# Patient Record
Sex: FEMALE
[De-identification: names, ages, dates, MRNs, and addresses within clinical notes are randomized per-mention and may not be internally consistent; named-entity substitution may affect disease eponyms.]

---

## 2017-04-11 ENCOUNTER — MEDICATION RENEWAL (OUTPATIENT)
Age: 22
End: 2017-04-11

## 2017-07-06 ENCOUNTER — MEDICATION RENEWAL (OUTPATIENT)
Age: 22
End: 2017-07-06

## 2017-08-23 ENCOUNTER — LABORATORY RESULT (OUTPATIENT)
Age: 22
End: 2017-08-23

## 2017-08-23 ENCOUNTER — APPOINTMENT (OUTPATIENT)
Dept: OBGYN | Facility: CLINIC | Age: 22
End: 2017-08-23
Payer: COMMERCIAL

## 2017-08-23 VITALS
BODY MASS INDEX: 23.7 KG/M2 | WEIGHT: 160 LBS | HEIGHT: 69 IN | DIASTOLIC BLOOD PRESSURE: 68 MMHG | SYSTOLIC BLOOD PRESSURE: 120 MMHG

## 2017-08-23 PROCEDURE — 99395 PREV VISIT EST AGE 18-39: CPT

## 2017-08-24 LAB
C TRACH RRNA SPEC QL NAA+PROBE: NORMAL
N GONORRHOEA RRNA SPEC QL NAA+PROBE: NORMAL
SOURCE TP AMPLIFICATION: NORMAL

## 2017-08-30 LAB — CYTOLOGY CVX/VAG DOC THIN PREP: NORMAL

## 2017-10-09 ENCOUNTER — MEDICATION RENEWAL (OUTPATIENT)
Age: 22
End: 2017-10-09

## 2018-05-14 ENCOUNTER — RX RENEWAL (OUTPATIENT)
Age: 23
End: 2018-05-14

## 2018-11-12 ENCOUNTER — RX RENEWAL (OUTPATIENT)
Age: 23
End: 2018-11-12

## 2019-02-05 ENCOUNTER — RX RENEWAL (OUTPATIENT)
Age: 24
End: 2019-02-05

## 2019-02-12 ENCOUNTER — RX RENEWAL (OUTPATIENT)
Age: 24
End: 2019-02-12

## 2019-03-11 ENCOUNTER — RX RENEWAL (OUTPATIENT)
Age: 24
End: 2019-03-11

## 2019-03-13 ENCOUNTER — RX RENEWAL (OUTPATIENT)
Age: 24
End: 2019-03-13

## 2019-03-14 ENCOUNTER — APPOINTMENT (OUTPATIENT)
Dept: OBGYN | Facility: CLINIC | Age: 24
End: 2019-03-14
Payer: COMMERCIAL

## 2019-03-14 VITALS
WEIGHT: 160 LBS | SYSTOLIC BLOOD PRESSURE: 106 MMHG | DIASTOLIC BLOOD PRESSURE: 64 MMHG | HEIGHT: 69 IN | BODY MASS INDEX: 23.7 KG/M2

## 2019-03-14 PROCEDURE — 99395 PREV VISIT EST AGE 18-39: CPT

## 2019-03-14 NOTE — CHIEF COMPLAINT
[Annual Visit] : annual visit [FreeTextEntry1] : Patient is present for her annual visit. LMP: 3 weeks ago. Pt states she has no GYN complaints at this time. Declined MA

## 2019-03-14 NOTE — HISTORY OF PRESENT ILLNESS
[___ Year(s) Ago] : [unfilled] year(s) ago [Good] : being in good health [Healthy Diet] : a healthy diet [Reproductive Age] : is of reproductive age

## 2019-03-15 LAB — HPV HIGH+LOW RISK DNA PNL CVX: NOT DETECTED

## 2019-03-20 LAB — CYTOLOGY CVX/VAG DOC THIN PREP: NORMAL

## 2019-04-11 ENCOUNTER — APPOINTMENT (OUTPATIENT)
Dept: OBGYN | Facility: CLINIC | Age: 24
End: 2019-04-11

## 2019-04-17 ENCOUNTER — APPOINTMENT (OUTPATIENT)
Dept: OBGYN | Facility: CLINIC | Age: 24
End: 2019-04-17

## 2019-09-03 ENCOUNTER — RX RENEWAL (OUTPATIENT)
Age: 24
End: 2019-09-03

## 2019-12-05 ENCOUNTER — RX RENEWAL (OUTPATIENT)
Age: 24
End: 2019-12-05

## 2020-03-03 ENCOUNTER — RX RENEWAL (OUTPATIENT)
Age: 25
End: 2020-03-03

## 2020-06-25 ENCOUNTER — APPOINTMENT (OUTPATIENT)
Dept: OBGYN | Facility: CLINIC | Age: 25
End: 2020-06-25

## 2020-07-12 ENCOUNTER — TRANSCRIPTION ENCOUNTER (OUTPATIENT)
Age: 25
End: 2020-07-12

## 2020-07-15 ENCOUNTER — TRANSCRIPTION ENCOUNTER (OUTPATIENT)
Age: 25
End: 2020-07-15

## 2020-09-01 ENCOUNTER — APPOINTMENT (OUTPATIENT)
Dept: OBGYN | Facility: CLINIC | Age: 25
End: 2020-09-01
Payer: COMMERCIAL

## 2020-09-01 VITALS
TEMPERATURE: 98.1 F | BODY MASS INDEX: 25.18 KG/M2 | DIASTOLIC BLOOD PRESSURE: 68 MMHG | SYSTOLIC BLOOD PRESSURE: 108 MMHG | HEIGHT: 69 IN | WEIGHT: 170 LBS

## 2020-09-01 PROCEDURE — 99395 PREV VISIT EST AGE 18-39: CPT

## 2020-09-01 NOTE — CHIEF COMPLAINT
[Annual Visit] : annual visit [FreeTextEntry1] : ERICKA ALCANTARA a 24 year old female presents in office today for a routine annual exam. LMP was 06/01/2020. Last exam was done on 3/14/2019.  Patient denies MOA in the room.

## 2020-09-01 NOTE — PHYSICAL EXAM
[Awake] : awake [Alert] : alert [Soft] : soft [Oriented x3] : oriented to person, place, and time [No Lesions] : no genitalia lesions [Normal] : uterus [Labia Majora] : labia major [No Bleeding] : there was no active vaginal bleeding [Pap Obtained] : a Pap smear was performed [Anteversion] : anteverted [Uterine Adnexae] : were not tender and not enlarged [RRR, No Murmurs] : RRR, no murmurs [CTAB] : CTAB [Acute Distress] : no acute distress [LAD] : no lymphadenopathy [Thyroid Nodule] : no thyroid nodule [Goiter] : no goiter [Mass] : no breast mass [Nipple Discharge] : no nipple discharge [Axillary LAD] : no axillary lymphadenopathy [Tender] : non tender [Distended] : not distended [H/Smegaly] : no hepatosplenomegaly [Depressed Mood] : not depressed [Flat Affect] : affect not flat [Discharge] : had no discharge [Motion Tenderness] : there was no cervical motion tenderness [Tenderness] : nontender [Enlarged ___ wks] : not enlarged [Adnexa Tenderness] : were not tender [Ovarian Mass (___ Cm)] : there were no adnexal masses [FreeTextEntry5] : GC chlamydia culture

## 2020-09-01 NOTE — HISTORY OF PRESENT ILLNESS
[1 Year Ago] : 1 year ago [Good] : being in good health [Healthy Diet] : a healthy diet [Reproductive Age] : is of reproductive age

## 2020-09-17 LAB — CYTOLOGY CVX/VAG DOC THIN PREP: ABNORMAL

## 2020-11-03 ENCOUNTER — APPOINTMENT (OUTPATIENT)
Dept: OBGYN | Facility: CLINIC | Age: 25
End: 2020-11-03
Payer: COMMERCIAL

## 2020-11-03 VITALS
WEIGHT: 160 LBS | HEIGHT: 69 IN | SYSTOLIC BLOOD PRESSURE: 120 MMHG | BODY MASS INDEX: 23.7 KG/M2 | TEMPERATURE: 98.1 F | DIASTOLIC BLOOD PRESSURE: 72 MMHG

## 2020-11-03 PROCEDURE — 99072 ADDL SUPL MATRL&STAF TM PHE: CPT

## 2020-11-03 PROCEDURE — 57454 BX/CURETT OF CERVIX W/SCOPE: CPT

## 2020-11-03 NOTE — PROCEDURE
[Colposcopy] : Colposcopy  [Time out performed] : Pre-procedure time out performed.  Patient's name, date of birth and procedure confirmed. [Consent Obtained] : Consent obtained [Risks] : risks [Benefits] : benefits [Alternatives] : alternatives [Patient] : patient [Infection] : infection [Bleeding] : bleeding [LGSIL] : LGSIL [No Premedication] : no premedication [Colposcopy Adequate] : colposcopy adequate [SCI Fully Visualized] : SCI fully visualized [ECC Performed] : ECC performed [Lesion] : lesion seen [Biopsy] : biopsy taken [Hemostasis Obtained] : Hemostasis obtained [Tolerated Well] : the patient tolerated the procedure well [de-identified] : 1 [de-identified] : after applying acetic acid to the cervix and visualized through colposcope slight acetowhite area at approximately 11:00 [de-identified] : 11 [de-identified] : fragment of tissue [de-identified] : using silver nitrate [de-identified] : hPV changes

## 2020-11-05 LAB
HCG UR QL: NEGATIVE
QUALITY CONTROL: YES

## 2020-11-13 LAB — CORE LAB BIOPSY: NORMAL

## 2021-03-08 ENCOUNTER — RX RENEWAL (OUTPATIENT)
Age: 26
End: 2021-03-08

## 2021-06-01 ENCOUNTER — RX RENEWAL (OUTPATIENT)
Age: 26
End: 2021-06-01

## 2021-06-14 ENCOUNTER — RX RENEWAL (OUTPATIENT)
Age: 26
End: 2021-06-14

## 2021-09-13 ENCOUNTER — RX RENEWAL (OUTPATIENT)
Age: 26
End: 2021-09-13

## 2021-09-14 ENCOUNTER — RX RENEWAL (OUTPATIENT)
Age: 26
End: 2021-09-14

## 2023-03-16 ENCOUNTER — APPOINTMENT (OUTPATIENT)
Dept: ORTHOPEDIC SURGERY | Facility: CLINIC | Age: 28
End: 2023-03-16
Payer: COMMERCIAL

## 2023-03-16 VITALS
BODY MASS INDEX: 23.7 KG/M2 | WEIGHT: 160 LBS | DIASTOLIC BLOOD PRESSURE: 81 MMHG | TEMPERATURE: 97.9 F | HEIGHT: 69 IN | SYSTOLIC BLOOD PRESSURE: 111 MMHG | HEART RATE: 101 BPM

## 2023-03-16 DIAGNOSIS — M25.561 PAIN IN RIGHT KNEE: ICD-10-CM

## 2023-03-16 PROCEDURE — 73562 X-RAY EXAM OF KNEE 3: CPT | Mod: RT

## 2023-03-16 PROCEDURE — 99203 OFFICE O/P NEW LOW 30 MIN: CPT

## 2023-03-16 NOTE — HISTORY OF PRESENT ILLNESS
[de-identified] : 3/16/2023: Dior is a pleasant 27-year-old female who presents to the office today for evaluation of a right knee injury.  The patient states that she was out with her friends and one of them tackled her when they are play fighting.  She felt a twisting sensation and a pop in her knee.  She subsequent developed some swelling in the knee over the next couple of days.  Since that time she has had persistent pain on the inside part of the knee.  She is generally pretty active and likes to workout in the gym.  She cannot squat, bend, or twist on the knee without pain.  She has never had the symptoms before this.  She does occasionally have sensations of instability which she has never had before.  The patient denies any fevers, chills, sweats, recent illnesses, numbness, tingling, weakness, or pain elsewhere at this time.

## 2023-03-16 NOTE — DISCUSSION/SUMMARY
[de-identified] : Assessment: 27-year-old female with right knee pain and swelling\par \par Plan: I had a long discussion with the patient today regarding the nature of their diagnosis and treatment plan. We discussed the risks and benefits of no treatment as well as nonoperative and operative treatments.  I reviewed the patient's x-rays today with her in the office which are negative for any fracture.  On examination she has pain about the medial and lateral joint lines and a mild effusion.  Given her mechanism of injury and age I would recommend an MRI to rule out any internal derangement such as meniscal injury, ligamentous injury, or loose body.  She may continue to treat herself symptomatically on her own at home.  Recommend follow-up after the MRI to discuss results in person then any further recommendations.  Should she have any significant internal derangement surgical intervention could be indicated.\par \par The patient verbalizes their understanding and agrees with the plan.  All questions were answered to their satisfaction.

## 2023-03-16 NOTE — PHYSICAL EXAM
[de-identified] : General:\par Awake, alert, no acute distress, Patient was cooperative and appropriate during the examination.\par \par The patient is of normal weight for height and age.\par \par Walks with an antalgic gait on the right side.\par \par Full, painless range of motion of the neck and back.\par \par Exam of the bilateral lower extremities is intact and symmetric with regards to dermatologic, vascular, and neurologic exam. Bilateral lower extremity sensation is grossly intact to light touch in the DP/SP/T/S/S nerve distributions. Intact DF/PF/EHL. BIlateral lower extremity warm and well-perfused with brisk capillary refill.\par \par Pulmonary:\par Regular, nonlabored breathing\par \par Abdomen:\par Soft, nontender, nondistended.\par \par Lymphatic:\par No evidence of inguinal lymphadenopathy\par \par Right knee Examination:\par Physical examination of the knee demonstrates normal skin without signs of skin changes or abnormalities. No erythema or warmth is appreciated. There is a mild joint effusion.\par \par Sensation is intact to light touch L2-S1\par Palpable DP/PT pulse\par EHL/FHL/TA/GSC motor function intact\par \par Range of Motion\par 0 to 130 degrees with mild pain at terminal flexion\par \par Strength Testing\par Quadriceps/Hamstring 5/5\par Patient is able to perform a straight leg raise without difficulty.\par \par Palpation\par Not tender to palpation about the distal femur, proximal tibia, or patella\par No palpable defect appreciated in the quadriceps or patellar tendons\par Mildly tender to palpation of medial joint line\par Mildly tender to palpation of lateral joint line\par \par Special Tests\par Anterior Drawer negative\par Posterior Drawer negative\par Lachman Exam negative\par No Varus or Valgus Laxity at 0 or 30 degrees of knee flexion\par Panda's Test positive laterally\par Active compression of the patella is negative for pain\par Translation of the patella 2 quadrants with a firm endpoint [de-identified] : X-rays including 3 views of the right knee were obtained in the office today on 3/16/2023 and reviewed the patient.  There is no acute fracture or dislocation.  There is no arthritis.

## 2023-03-21 ENCOUNTER — OUTPATIENT (OUTPATIENT)
Dept: OUTPATIENT SERVICES | Facility: HOSPITAL | Age: 28
LOS: 1 days | End: 2023-03-21
Payer: COMMERCIAL

## 2023-03-21 ENCOUNTER — APPOINTMENT (OUTPATIENT)
Dept: MRI IMAGING | Facility: CLINIC | Age: 28
End: 2023-03-21
Payer: COMMERCIAL

## 2023-03-21 ENCOUNTER — NON-APPOINTMENT (OUTPATIENT)
Age: 28
End: 2023-03-21

## 2023-03-21 DIAGNOSIS — M25.561 PAIN IN RIGHT KNEE: ICD-10-CM

## 2023-03-21 PROCEDURE — 73721 MRI JNT OF LWR EXTRE W/O DYE: CPT

## 2023-03-21 PROCEDURE — 73721 MRI JNT OF LWR EXTRE W/O DYE: CPT | Mod: 26,RT

## 2023-04-25 ENCOUNTER — APPOINTMENT (OUTPATIENT)
Dept: OBGYN | Facility: CLINIC | Age: 28
End: 2023-04-25
Payer: COMMERCIAL

## 2023-04-25 ENCOUNTER — LABORATORY RESULT (OUTPATIENT)
Age: 28
End: 2023-04-25

## 2023-04-25 VITALS
DIASTOLIC BLOOD PRESSURE: 80 MMHG | SYSTOLIC BLOOD PRESSURE: 118 MMHG | HEIGHT: 69 IN | WEIGHT: 160 LBS | BODY MASS INDEX: 23.7 KG/M2

## 2023-04-25 DIAGNOSIS — Z01.419 ENCOUNTER FOR GYNECOLOGICAL EXAMINATION (GENERAL) (ROUTINE) W/OUT ABNORMAL FINDINGS: ICD-10-CM

## 2023-04-25 PROCEDURE — 99395 PREV VISIT EST AGE 18-39: CPT

## 2023-04-25 NOTE — DISCUSSION/SUMMARY
[FreeTextEntry1] : Assessment is normal GYN exam.  Patient will continue on OCPs as prescribed.  States that she has received Gardasil vaccination series.  She does not smoke.  Medical assistant Allie was present in the room during entire examination and discussion.

## 2023-04-25 NOTE — PHYSICAL EXAM
[Chaperone Present] : A chaperone was present in the examining room during all aspects of the physical examination [Appropriately responsive] : appropriately responsive [Alert] : alert [No Acute Distress] : no acute distress [No Lymphadenopathy] : no lymphadenopathy [Regular Rate Rhythm] : regular rate rhythm [No Murmurs] : no murmurs [Clear to Auscultation B/L] : clear to auscultation bilaterally [Soft] : soft [Non-tender] : non-tender [Non-distended] : non-distended [No HSM] : No HSM [No Lesions] : no lesions [No Mass] : no mass [Oriented x3] : oriented x3 [Examination Of The Breasts] : a normal appearance [No Masses] : no breast masses were palpable [Labia Majora] : normal [Labia Minora] : normal [Normal] : normal [Anteversion] : anteverted [Uterine Adnexae] : normal [FreeTextEntry1] : darrell [Tenderness] : nontender [Enlarged ___ wks] : not enlarged

## 2023-04-25 NOTE — HISTORY OF PRESENT ILLNESS
[FreeTextEntry1] : Patient is a 27 year old female who presents for her annual exam.  Last pap was 9/1/20.  LMP was 2/28/23.  Allie Mast CMA present for entire exam. [TextBox_4] : Patient is here for a yearly exam. Has no GYN complaints or issues currently. Normal urination and bowel function. No abnormal vaginal bleeding or staining. Past medical, surgical and family history reviewed and updated.  Taking OCPs and wants to continue

## 2023-04-27 LAB
C TRACH RRNA SPEC QL NAA+PROBE: NOT DETECTED
N GONORRHOEA RRNA SPEC QL NAA+PROBE: NOT DETECTED
SOURCE TP AMPLIFICATION: NORMAL

## 2023-05-05 LAB — CYTOLOGY CVX/VAG DOC THIN PREP: ABNORMAL

## 2023-05-09 ENCOUNTER — APPOINTMENT (OUTPATIENT)
Dept: OBGYN | Facility: CLINIC | Age: 28
End: 2023-05-09
Payer: COMMERCIAL

## 2023-05-09 VITALS
BODY MASS INDEX: 24.44 KG/M2 | WEIGHT: 165 LBS | DIASTOLIC BLOOD PRESSURE: 74 MMHG | HEIGHT: 69 IN | SYSTOLIC BLOOD PRESSURE: 116 MMHG

## 2023-05-09 LAB
HCG UR QL: NEGATIVE
QUALITY CONTROL: YES

## 2023-05-09 PROCEDURE — 57454 BX/CURETT OF CERVIX W/SCOPE: CPT

## 2023-05-09 NOTE — PROCEDURE
[Colposcopy] : Colposcopy  [Time out performed] : Pre-procedure time out performed.  Patient's name, date of birth and procedure confirmed. [Consent Obtained] : Consent obtained [Risks] : risks [Benefits] : benefits [Alternatives] : alternatives [Patient] : patient [Infection] : infection [Bleeding] : bleeding [ASCUS] : ASCUS [HPV High Risk] : HPV high risk [No Premedication] : no premedication [Colposcopy Adequate] : colposcopy adequate [SCI Fully Visualized] : SCI fully visualized [ECC Performed] : ECC performed [No Abnormalities] : no abnormalities [Lesion] : lesion seen [Hemostasis Obtained] : Hemostasis obtained [Tolerated Well] : the patient tolerated the procedure well [Pap Performed] : pap not performed [Biopsy] : biopsy not taken [de-identified] : 1 [de-identified] : a/w at 7:00 [de-identified] : 7:00 [de-identified] : slightly A/w, no punctation or mosaicism [de-identified] : Silver nitrate [de-identified] : HPV changes

## 2023-05-18 RX ORDER — LEVONORGESTREL / ETHINYL ESTRADIOL AND ETHINYL ESTRADIOL 150-30(84)
0.15-0.03 &0.01 KIT ORAL DAILY
Qty: 91 | Refills: 1 | Status: COMPLETED | COMMUNITY
Start: 2017-04-11 | End: 2023-05-18

## 2023-05-18 RX ORDER — LEVONORGESTREL AND ETHINYL ESTRADIOL AND ETHINYL ESTRADIOL 150-30(84)
0.15-0.03 &0.01 KIT ORAL
Qty: 91 | Refills: 0 | Status: COMPLETED | COMMUNITY
Start: 2021-06-01 | End: 2023-05-18

## 2023-05-19 LAB — CORE LAB BIOPSY: NORMAL

## 2023-10-30 ENCOUNTER — NON-APPOINTMENT (OUTPATIENT)
Age: 28
End: 2023-10-30

## 2023-12-19 RX ORDER — LEVONORGESTREL AND ETHINYL ESTRADIOL AND ETHINYL ESTRADIOL 150-30(84)
0.15-0.03 &0.01 KIT ORAL
Qty: 91 | Refills: 0 | Status: ACTIVE | COMMUNITY
Start: 2018-05-14